# Patient Record
Sex: MALE | Race: WHITE | NOT HISPANIC OR LATINO | Employment: UNEMPLOYED | URBAN - METROPOLITAN AREA
[De-identification: names, ages, dates, MRNs, and addresses within clinical notes are randomized per-mention and may not be internally consistent; named-entity substitution may affect disease eponyms.]

---

## 2021-02-04 ENCOUNTER — HOSPITAL ENCOUNTER (EMERGENCY)
Facility: HOSPITAL | Age: 56
Discharge: HOME | End: 2021-02-04
Attending: EMERGENCY MEDICINE | Admitting: EMERGENCY MEDICINE
Payer: COMMERCIAL

## 2021-02-04 VITALS
TEMPERATURE: 98.1 F | DIASTOLIC BLOOD PRESSURE: 60 MMHG | SYSTOLIC BLOOD PRESSURE: 107 MMHG | WEIGHT: 250 LBS | OXYGEN SATURATION: 97 % | HEIGHT: 75 IN | BODY MASS INDEX: 31.08 KG/M2 | RESPIRATION RATE: 18 BRPM | HEART RATE: 79 BPM

## 2021-02-04 DIAGNOSIS — F32.A DEPRESSION: Primary | ICD-10-CM

## 2021-02-04 PROCEDURE — 99282 EMERGENCY DEPT VISIT SF MDM: CPT | Performed by: EMERGENCY MEDICINE

## 2021-02-04 PROCEDURE — 99284 EMERGENCY DEPT VISIT MOD MDM: CPT

## 2021-02-05 NOTE — ED PROVIDER NOTES
History  Chief Complaint   Patient presents with    Psychiatric Evaluation     pt states that his PCP sent him here for depression states he is having a hard time going out and doing anything, denies HI , and states sometimes he has SI but no plan      HPI patient is a 40-year-old male, history of depression in the past, here with his mother who reports that he was on medication in the past which seems to help him she does not know what it is  Apparently she has tried to get him into a psychiatric helpful but apparently has difficulty making any appointment due to [place]  Apparently was seen by his family provider referred to the emergency department  Patient has no plan  He does not have suicidal thoughts he reports he just feels like he should not be here anymore  He has not made any action on a plan to harm himself  He has no real desire to kill himself currently but reports he feels very sad  Past medical history of appendectomy depression, patient reports cardiomyopathy but does not know his medications  Family history noncontributory  Social history, nonsmoker no history of drug abuse, here with his mother    None       History reviewed  No pertinent past medical history  Past Surgical History:   Procedure Laterality Date    APPENDECTOMY         History reviewed  No pertinent family history  I have reviewed and agree with the history as documented  E-Cigarette/Vaping    E-Cigarette Use Never User      E-Cigarette/Vaping Substances     Social History     Tobacco Use    Smoking status: Never Smoker    Smokeless tobacco: Never Used   Substance Use Topics    Alcohol use: Never     Frequency: Never    Drug use: Not on file       Review of Systems   Constitutional: Negative for diaphoresis, fatigue and fever  HENT: Negative for congestion, ear pain, nosebleeds and sore throat  Eyes: Negative for photophobia, pain, discharge and visual disturbance     Respiratory: Negative for cough, choking, chest tightness, shortness of breath and wheezing  Cardiovascular: Negative for chest pain and palpitations  Gastrointestinal: Negative for abdominal distention, abdominal pain, diarrhea and vomiting  Genitourinary: Negative for dysuria, flank pain and frequency  Musculoskeletal: Negative for back pain, gait problem and joint swelling  Skin: Negative for color change and rash  Neurological: Negative for dizziness, syncope and headaches  Psychiatric/Behavioral: Negative for behavioral problems, confusion, self-injury and suicidal ideas  The patient is not nervous/anxious  All other systems reviewed and are negative  Physical Exam  Physical Exam  Vitals signs and nursing note reviewed  Constitutional:       Appearance: He is well-developed  HENT:      Head: Normocephalic  Right Ear: External ear normal       Left Ear: External ear normal       Nose: Nose normal    Eyes:      General: Lids are normal       Pupils: Pupils are equal, round, and reactive to light  Neck:      Musculoskeletal: Normal range of motion and neck supple  Cardiovascular:      Rate and Rhythm: Normal rate and regular rhythm  Pulmonary:      Effort: Pulmonary effort is normal  No respiratory distress  Breath sounds: Normal breath sounds  Musculoskeletal: Normal range of motion  General: No deformity  Skin:     General: Skin is warm and dry  Neurological:      Mental Status: He is alert and oriented to person, place, and time  Psychiatric:      Comments: Patient denies suicidal ideation or plan to me, denies any homicidal thoughts  He reports feeling sad and depressed at times           Vital Signs  ED Triage Vitals [02/04/21 1831]   Temperature Pulse Respirations Blood Pressure SpO2   98 1 °F (36 7 °C) 79 18 107/60 97 %      Temp Source Heart Rate Source Patient Position - Orthostatic VS BP Location FiO2 (%)   Temporal Monitor Sitting Left arm --      Pain Score       --           Vitals: 02/04/21 1831   BP: 107/60   Pulse: 79   Patient Position - Orthostatic VS: Sitting         Visual Acuity      ED Medications  Medications - No data to display    Diagnostic Studies  Results Reviewed     None                 No orders to display              Procedures  Procedures         ED Course                    seen with the crisis worker, there is no suicide plan, no indication for admission at this time, no indication to remove the patient's rights  SBIRT 20yo+      Most Recent Value   SBIRT (24 yo +)   In order to provide better care to our patients, we are screening all of our patients for alcohol and drug use  Would it be okay to ask you these screening questions? Yes Filed at: 02/04/2021 2053   Initial Alcohol Screen: US AUDIT-C    1  How often do you have a drink containing alcohol?  0 Filed at: 02/04/2021 2053   2  How many drinks containing alcohol do you have on a typical day you are drinking? 0 Filed at: 02/04/2021 2053   3a  Male UNDER 65: How often do you have five or more drinks on one occasion? 0 Filed at: 02/04/2021 2053   3b  FEMALE Any Age, or MALE 65+: How often do you have 4 or more drinks on one occassion? 0 Filed at: 02/04/2021 2053   Audit-C Score  0 Filed at: 02/04/2021 2053   DEANNA: How many times in the past year have you    Used an illegal drug or used a prescription medication for non-medical reasons? Never Filed at: 02/04/2021 2053                    MDM medical decision making 49-year-old male presents emergency department with depression, referred by his family doctor, discussed with the mom, she is having trouble getting a psychiatrist to see him  We gave her a copy of the local referrals and I circled the psychiatrist to would be most likely to have time to see the patient  We discussed patient is not currently suicidal there are no grounds for commitment or admission at this time  Discussed outpatient treatment and follow-up      Disposition  Final diagnoses:   Depression     Time reflects when diagnosis was documented in both MDM as applicable and the Disposition within this note     Time User Action Codes Description Comment    2/4/2021  9:28 PM Jacqueline Morning Add [F32 9] Depression       ED Disposition     ED Disposition Condition Date/Time Comment    Discharge Stable Thu Feb 4, 2021  9:28 PM [address] [place] [address] Smiley discharge to home/self care  MD Documentation      Most Recent Value   Sending MD Sarah Olvera      Follow-up Information    None         There are no discharge medications for this patient  No discharge procedures on file      PDMP Review     None          ED Provider  Electronically Signed by           Sarah Olvera MD  02/04/21 4049

## 2021-02-05 NOTE — ED NOTES
BRYANNA met with Gregorio Snowden who was sent to the ED following a doctors appointment with his PCP  Patient reports being depressed over medical problems and the fact he hasn't worked in a year  Patient moved to [place] due to health issues and has is living with his mother for approximately 1 year  Patient states that at times he has thoughts that it would be better off if he weren't here and has thoughts of hopelessness, patient reports he does not think of killing himself he reports he could never do that   He denies hi/av hallucinations  Patient is oriented x4 and feels that he can contract for safety outside of the hospital   Patient is looking for OP resources to get back on medications  CW spoke with attending who was agreeable to this plan,  BRYANNA provided OP resources to patient      JK-CIS

## 2021-11-09 ENCOUNTER — HOSPITAL ENCOUNTER (EMERGENCY)
Facility: HOSPITAL | Age: 56
Discharge: HOME | End: 2021-11-09
Attending: EMERGENCY MEDICINE | Admitting: EMERGENCY MEDICINE
Payer: COMMERCIAL

## 2021-11-09 ENCOUNTER — APPOINTMENT (EMERGENCY)
Dept: RADIOLOGY | Facility: HOSPITAL | Age: 56
End: 2021-11-09
Payer: COMMERCIAL

## 2021-11-09 VITALS
BODY MASS INDEX: 33.57 KG/M2 | HEIGHT: 75 IN | OXYGEN SATURATION: 94 % | RESPIRATION RATE: 21 BRPM | DIASTOLIC BLOOD PRESSURE: 81 MMHG | SYSTOLIC BLOOD PRESSURE: 154 MMHG | TEMPERATURE: 97 F | HEART RATE: 77 BPM | WEIGHT: 270 LBS

## 2021-11-09 DIAGNOSIS — R53.1 GENERALIZED WEAKNESS: Primary | ICD-10-CM

## 2021-11-09 LAB
2HR DELTA HS TROPONIN: 1 NG/L
ALBUMIN SERPL BCP-MCNC: 4.1 G/DL (ref 3.5–5)
ALP SERPL-CCNC: 73 U/L (ref 46–116)
ALT SERPL W P-5'-P-CCNC: 34 U/L (ref 12–78)
ANION GAP SERPL CALCULATED.3IONS-SCNC: 13 MMOL/L (ref 4–13)
AST SERPL W P-5'-P-CCNC: 12 U/L (ref 5–45)
BASOPHILS # BLD MANUAL: 0.32 THOUSAND/UL (ref 0–0.1)
BASOPHILS NFR MAR MANUAL: 2 % (ref 0–1)
BILIRUB DIRECT SERPL-MCNC: 0.22 MG/DL (ref 0–0.2)
BILIRUB SERPL-MCNC: 1.52 MG/DL (ref 0.2–1)
BUN SERPL-MCNC: 21 MG/DL (ref 5–25)
CALCIUM SERPL-MCNC: 8.8 MG/DL (ref 8.3–10.1)
CARDIAC TROPONIN I PNL SERPL HS: 6 NG/L
CARDIAC TROPONIN I PNL SERPL HS: 7 NG/L
CHLORIDE SERPL-SCNC: 107 MMOL/L (ref 100–108)
CO2 SERPL-SCNC: 23 MMOL/L (ref 21–32)
CREAT SERPL-MCNC: 1.08 MG/DL (ref 0.6–1.3)
EOSINOPHIL # BLD MANUAL: 0 THOUSAND/UL (ref 0–0.4)
EOSINOPHIL NFR BLD MANUAL: 0 % (ref 0–6)
ERYTHROCYTE [DISTWIDTH] IN BLOOD BY AUTOMATED COUNT: 14.3 % (ref 11.6–15.1)
FLUAV RNA RESP QL NAA+PROBE: NEGATIVE
FLUBV RNA RESP QL NAA+PROBE: NEGATIVE
GFR SERPL CREATININE-BSD FRML MDRD: 76 ML/MIN/1.73SQ M
GLUCOSE SERPL-MCNC: 120 MG/DL (ref 65–140)
HCT VFR BLD AUTO: 46.6 % (ref 36.5–49.3)
HGB BLD-MCNC: 15.4 G/DL (ref 12–17)
LACTATE SERPL-SCNC: 1.3 MMOL/L (ref 0.5–2)
LIPASE SERPL-CCNC: 94 U/L (ref 73–393)
LYMPHOCYTES # BLD AUTO: 55 % (ref 14–44)
LYMPHOCYTES # BLD AUTO: 8.9 THOUSAND/UL (ref 0.6–4.47)
MCH RBC QN AUTO: 30.2 PG (ref 26.8–34.3)
MCHC RBC AUTO-ENTMCNC: 33 G/DL (ref 31.4–37.4)
MCV RBC AUTO: 91 FL (ref 82–98)
MONOCYTES # BLD AUTO: 0.49 THOUSAND/UL (ref 0–1.22)
MONOCYTES NFR BLD: 3 % (ref 4–12)
NEUTROPHILS # BLD MANUAL: 5.02 THOUSAND/UL (ref 1.85–7.62)
NEUTS SEG NFR BLD AUTO: 31 % (ref 43–75)
NT-PROBNP SERPL-MCNC: 420 PG/ML
PLATELET # BLD AUTO: 239 THOUSANDS/UL (ref 149–390)
PLATELET BLD QL SMEAR: ADEQUATE
PMV BLD AUTO: 9.7 FL (ref 8.9–12.7)
POTASSIUM SERPL-SCNC: 4.2 MMOL/L (ref 3.5–5.3)
PROT SERPL-MCNC: 7.4 G/DL (ref 6.4–8.2)
RBC # BLD AUTO: 5.1 MILLION/UL (ref 3.88–5.62)
RSV RNA RESP QL NAA+PROBE: NEGATIVE
SARS-COV-2 RNA RESP QL NAA+PROBE: NEGATIVE
SODIUM SERPL-SCNC: 143 MMOL/L (ref 136–145)
VARIANT LYMPHS # BLD AUTO: 9 %
WBC # BLD AUTO: 16.18 THOUSAND/UL (ref 4.31–10.16)

## 2021-11-09 PROCEDURE — 85007 BL SMEAR W/DIFF WBC COUNT: CPT | Performed by: EMERGENCY MEDICINE

## 2021-11-09 PROCEDURE — 71046 X-RAY EXAM CHEST 2 VIEWS: CPT

## 2021-11-09 PROCEDURE — 84484 ASSAY OF TROPONIN QUANT: CPT | Performed by: EMERGENCY MEDICINE

## 2021-11-09 PROCEDURE — 80076 HEPATIC FUNCTION PANEL: CPT | Performed by: EMERGENCY MEDICINE

## 2021-11-09 PROCEDURE — 99285 EMERGENCY DEPT VISIT HI MDM: CPT

## 2021-11-09 PROCEDURE — 80048 BASIC METABOLIC PNL TOTAL CA: CPT | Performed by: EMERGENCY MEDICINE

## 2021-11-09 PROCEDURE — 85027 COMPLETE CBC AUTOMATED: CPT | Performed by: EMERGENCY MEDICINE

## 2021-11-09 PROCEDURE — 83690 ASSAY OF LIPASE: CPT | Performed by: EMERGENCY MEDICINE

## 2021-11-09 PROCEDURE — 93005 ELECTROCARDIOGRAM TRACING: CPT

## 2021-11-09 PROCEDURE — 0241U HB NFCT DS VIR RESP RNA 4 TRGT: CPT | Performed by: EMERGENCY MEDICINE

## 2021-11-09 PROCEDURE — 83880 ASSAY OF NATRIURETIC PEPTIDE: CPT | Performed by: EMERGENCY MEDICINE

## 2021-11-09 PROCEDURE — 99284 EMERGENCY DEPT VISIT MOD MDM: CPT | Performed by: EMERGENCY MEDICINE

## 2021-11-09 PROCEDURE — 36415 COLL VENOUS BLD VENIPUNCTURE: CPT | Performed by: EMERGENCY MEDICINE

## 2021-11-09 PROCEDURE — 83605 ASSAY OF LACTIC ACID: CPT | Performed by: EMERGENCY MEDICINE

## 2021-11-10 LAB
ATRIAL RATE: 98 BPM
P AXIS: 48 DEGREES
PR INTERVAL: 176 MS
QRS AXIS: 4 DEGREES
QRSD INTERVAL: 122 MS
QT INTERVAL: 380 MS
QTC INTERVAL: 485 MS
T WAVE AXIS: 50 DEGREES
VENTRICULAR RATE: 98 BPM

## 2021-11-10 PROCEDURE — 93010 ELECTROCARDIOGRAM REPORT: CPT | Performed by: INTERNAL MEDICINE

## 2023-04-28 ENCOUNTER — TELEPHONE (OUTPATIENT)
Dept: GASTROENTEROLOGY | Facility: CLINIC | Age: 58
End: 2023-04-28

## 2023-05-09 ENCOUNTER — OFFICE VISIT (OUTPATIENT)
Dept: GASTROENTEROLOGY | Facility: CLINIC | Age: 58
End: 2023-05-09

## 2023-05-09 VITALS
HEART RATE: 78 BPM | DIASTOLIC BLOOD PRESSURE: 80 MMHG | BODY MASS INDEX: 33.22 KG/M2 | WEIGHT: 267.2 LBS | HEIGHT: 75 IN | SYSTOLIC BLOOD PRESSURE: 112 MMHG | OXYGEN SATURATION: 97 % | RESPIRATION RATE: 18 BRPM

## 2023-05-09 DIAGNOSIS — Z87.19 HISTORY OF ESOPHAGITIS: ICD-10-CM

## 2023-05-09 DIAGNOSIS — R19.7 DIARRHEA, UNSPECIFIED TYPE: ICD-10-CM

## 2023-05-09 DIAGNOSIS — R11.0 NAUSEA: Primary | ICD-10-CM

## 2023-05-09 DIAGNOSIS — R63.0 POOR APPETITE: ICD-10-CM

## 2023-05-09 RX ORDER — SPIRONOLACTONE 25 MG/1
TABLET ORAL
COMMUNITY
Start: 2023-03-26

## 2023-05-09 RX ORDER — SIMVASTATIN 40 MG
40 TABLET ORAL DAILY
COMMUNITY
Start: 2023-03-17

## 2023-05-09 RX ORDER — SERTRALINE HYDROCHLORIDE 100 MG/1
TABLET, FILM COATED ORAL
COMMUNITY
Start: 2023-02-02

## 2023-05-09 RX ORDER — PANTOPRAZOLE SODIUM 40 MG/1
40 TABLET, DELAYED RELEASE ORAL DAILY
Qty: 30 TABLET | Refills: 2 | Status: SHIPPED | OUTPATIENT
Start: 2023-05-09 | End: 2023-06-08

## 2023-05-09 RX ORDER — MULTIVIT-MIN/IRON FUM/FOLIC AC 7.5 MG-4
1 TABLET ORAL
COMMUNITY

## 2023-05-09 RX ORDER — PANTOPRAZOLE SODIUM 20 MG/1
TABLET, DELAYED RELEASE ORAL
COMMUNITY
Start: 2023-05-03 | End: 2023-05-09 | Stop reason: ALTCHOICE

## 2023-05-09 RX ORDER — BUPROPION HYDROCHLORIDE 300 MG/1
300 TABLET ORAL
COMMUNITY
Start: 2022-12-01

## 2023-05-09 RX ORDER — ONDANSETRON 4 MG/1
TABLET, ORALLY DISINTEGRATING ORAL
COMMUNITY
Start: 2023-04-27

## 2023-05-09 RX ORDER — CLONAZEPAM 0.5 MG/1
0.5 TABLET ORAL DAILY PRN
COMMUNITY

## 2023-05-09 RX ORDER — CARVEDILOL 3.12 MG/1
TABLET ORAL
COMMUNITY
Start: 2023-03-09

## 2023-05-09 RX ORDER — FOLIC ACID 1 MG/1
1 TABLET ORAL DAILY
COMMUNITY
Start: 2022-12-01

## 2023-05-09 RX ORDER — SACUBITRIL AND VALSARTAN 49; 51 MG/1; MG/1
1 TABLET, FILM COATED ORAL 2 TIMES DAILY
COMMUNITY
Start: 2023-04-06

## 2023-05-09 NOTE — PATIENT INSTRUCTIONS
Scheduled date of EGD/colonoscopy (as of today): 7/31/23  Physician performing EGD/colonoscopy: Phill Kang  Location of EGD/colonoscopy: [facility]  Desired bowel prep reviewed with patient: Lucio/Benjamin  Instructions reviewed with patient by: Judene Merlin T  Clearances:

## 2023-05-09 NOTE — PROGRESS NOTES
[facility] - Outpatient Consultation  Evy Gutierrez 59 y o  male MRN: [id number]  Encounter: [id number]          ASSESSMENT AND PLAN:      1  Nausea  2  Poor appetite  3  Diarrhea  4  History of esophagitis    Patient presents with a history of recurrent nausea after eating, poor appetite, and intermittent diarrhea  He has a history of significant esophagitis on a prior EGD in 2021  He also is struggling with depression  Will plan for EGD and colonoscopy with visualization of the terminal ileum to investigate  Will check a RUQ ultrasound and CT Scan A/P with contrast to investigate  Will check celiac serology  Recommend a fiber supplement and will also begin a PPI course with Pantoprazole 40mg po daily x 8 weeks  Follow up for management of depression as well     ______________________________________________________________________    HPI:  Patient is a 59year old male with a PMH of CLL, cardiomyopathy/CHF, HTN, CAD, depression who presents to the office for a gastrointestinal evaluation  Patient reports that he has been having issues with nausea, poor appetite, and intermittent diarrhea  He is very worried about these symptoms and is concerned he may have an underlying cancer  No rectal bleeding or melena  He also has intermittent dysphagia  He nausea can occur after eating  He had an EGD in 2021 which showed significant esophagitis  No family history of esophageal, stomach, pancreatic, or colon cancer  He also reports he struggles with depression  REVIEW OF SYSTEMS:    CONSTITUTIONAL: Denies any fever, chills, rigors, and weight loss  HEENT: No earache or tinnitus  Denies hearing loss or visual disturbances  CARDIOVASCULAR: No chest pain or palpitations  RESPIRATORY: Denies any cough, hemoptysis, shortness of breath or dyspnea on exertion  GASTROINTESTINAL: As noted in the History of Present Illness  GENITOURINARY: No problems with urination   Denies any "hematuria or dysuria  NEUROLOGIC: No dizziness or vertigo, denies headaches  MUSCULOSKELETAL: Denies any muscle or joint pain  SKIN: Denies skin rashes or itching  ENDOCRINE: Denies excessive thirst  Denies intolerance to heat or cold  PSYCHOSOCIAL: +Depression  Denies any recent memory loss  Historical Information   Past Medical History:   Diagnosis Date   • Coronary artery disease    • Hypertension    • Leukemia ([facility] [address] )      Past Surgical History:   Procedure Laterality Date   • APPENDECTOMY       Social History   Social History     Substance and Sexual Activity   Alcohol Use Never     Social History     Substance and Sexual Activity   Drug Use Not on file     Social History     Tobacco Use   Smoking Status Never   Smokeless Tobacco Never     No family history on file  Meds/Allergies       Current Outpatient Medications:   •  buPROPion (WELLBUTRIN XL) 300 mg 24 hr tablet  •  carvedilol (COREG) 3 125 mg tablet  •  Cholecalciferol 25 MCG (1000 UT) tablet  •  clonazePAM (KlonoPIN) 0 5 mg tablet  •  Entresto 65-90 MG TABS  •  folic acid (FOLVITE) 1 mg tablet  •  Multiple Vitamins-Minerals (multivitamin with minerals) tablet  •  ondansetron (ZOFRAN-ODT) 4 mg disintegrating tablet  •  pantoprazole (PROTONIX) 40 mg tablet  •  sertraline (ZOLOFT) 100 mg tablet  •  simvastatin (ZOCOR) 40 mg tablet  •  spironolactone (ALDACTONE) 25 mg tablet    No Known Allergies        Objective     Blood pressure 112/80, pulse 78, resp  rate 18, height 6' 3\" (1 905 m), weight 121 kg (267 lb 3 2 oz), SpO2 97 %  Body mass index is 33 4 kg/m²  PHYSICAL EXAM:      General Appearance:   Alert, cooperative, no distress   HEENT:   Normocephalic, atraumatic, anicteric      Neck:  Supple, symmetrical, trachea midline   Lungs:   Clear to auscultation bilaterally; no rales, rhonchi or wheezing; respirations unlabored    Heart[de-identified]   Regular rate and rhythm; no murmur, rub, or gallop     Abdomen:   Soft, non-tender, non-distended; " normal bowel sounds; no masses, no organomegaly    Genitalia:   Deferred    Rectal:   Deferred    Extremities:  No cyanosis, clubbing or edema    Pulses:  2+ and symmetric    Skin:  No jaundice, rashes, or lesions    Lymph nodes:  No palpable cervical lymphadenopathy        Lab Results:   No visits with results within 1 Day(s) from this visit     Latest known visit with results is:   Admission on 11/09/2021, Discharged on 11/09/2021   Component Date Value   • Sodium 11/09/2021 143    • Potassium 11/09/2021 4 2    • Chloride 11/09/2021 107    • CO2 11/09/2021 23    • ANION GAP 11/09/2021 13    • BUN 11/09/2021 21    • Creatinine 11/09/2021 1 08    • Glucose 11/09/2021 120    • Calcium 11/09/2021 8 8    • eGFR 11/09/2021 76    • Total Bilirubin 11/09/2021 1 52 (H)    • Bilirubin, Direct 11/09/2021 0 22 (H)    • Alkaline Phosphatase 11/09/2021 73    • AST 11/09/2021 12    • ALT 11/09/2021 34    • Total Protein 11/09/2021 7 4    • Albumin 11/09/2021 4 1    • WBC 11/09/2021 16 18 (H)    • RBC 11/09/2021 5 10    • Hemoglobin 11/09/2021 15 4    • Hematocrit 11/09/2021 46 6    • MCV 11/09/2021 91    • MCH 11/09/2021 30 2    • MCHC 11/09/2021 33 0    • RDW 11/09/2021 14 3    • MPV 11/09/2021 9 7    • Platelets 74/40/9271 239    • LACTIC ACID 11/09/2021 1 3    • Lipase 11/09/2021 94    • NT-proBNP 11/09/2021 420 (H)    • hs TnI 0hr 11/09/2021 6    • SARS-CoV-2 11/09/2021 Negative    • INFLUENZA A PCR 11/09/2021 Negative    • INFLUENZA B PCR 11/09/2021 Negative    • RSV PCR 11/09/2021 Negative    • hs TnI 2hr 11/09/2021 7    • Delta 2hr hsTnI 11/09/2021 1    • Segmented % 11/09/2021 31 (L)    • Lymphocytes % 11/09/2021 55 (H)    • Monocytes % 11/09/2021 3 (L)    • Eosinophils, % 11/09/2021 0    • Basophils % 11/09/2021 2 (H)    • Atypical Lymphocytes % 11/09/2021 9 (H)    • Absolute Neutrophils 11/09/2021 5 02    • Lymphocytes Absolute 11/09/2021 8 90 (H)    • Monocytes Absolute 11/09/2021 0 49    • Eosinophils Absolute 11/09/2021 0 00    • Basophils Absolute 11/09/2021 0 32 (H)    • Platelet Estimate 71/27/8371 Adequate    • Ventricular Rate 11/09/2021 98    • Atrial Rate 11/09/2021 98    • FL Interval 11/09/2021 176    • QRSD Interval 11/09/2021 122    • QT Interval 11/09/2021 380    • QTC Interval 11/09/2021 485    • P Axis 11/09/2021 48    • QRS Axis 11/09/2021 4    • T Wave Axis 11/09/2021 50          Radiology Results:   No results found

## 2023-05-18 ENCOUNTER — HOSPITAL ENCOUNTER (OUTPATIENT)
Dept: ULTRASOUND IMAGING | Facility: CLINIC | Age: 58
Discharge: HOME | End: 2023-05-18

## 2023-05-18 DIAGNOSIS — R63.0 POOR APPETITE: ICD-10-CM

## 2023-05-18 DIAGNOSIS — R11.0 NAUSEA: ICD-10-CM

## 2023-05-23 ENCOUNTER — TELEPHONE (OUTPATIENT)
Dept: OTHER | Facility: OTHER | Age: 58
End: 2023-05-23

## 2023-05-23 DIAGNOSIS — K80.20 GALLSTONES: Primary | ICD-10-CM

## 2023-05-23 NOTE — TELEPHONE ENCOUNTER
Patient requesting a call back to discuss test results         Ordering Provider: Kathya Olivia PA-C  Date Completed: 5/18/23  Lab []  MRI []  X-Ray []  CT []  Colonoscopy []  EGD []  Biopsy []  Other [x]US Abdomen

## 2023-05-23 NOTE — TELEPHONE ENCOUNTER
Reviewed results with Lilliana Headley  US shows gallstones, no evidence of acute cholecystitis  Unclear if his symptoms/postprandial nausea is caused by the gallstones at this time or not  He did not try the PPI course yet (he has a h/o significant esophagitis in the past)  She will pick it up for him to start  He needs to proceed with the CT Scan A/P with contrast and the endoscopic evaluation to further evaluate for other potential etiologies of his symptoms    I will plan a general surgery consult regarding the gallstones but we did discuss that he definitely needs to still undergo the further gastrointestinal testing first

## 2023-05-25 ENCOUNTER — HOSPITAL ENCOUNTER (OUTPATIENT)
Dept: RADIOLOGY | Facility: MEDICAL CENTER | Age: 58
Discharge: HOME | End: 2023-05-25

## 2023-05-25 DIAGNOSIS — R63.0 POOR APPETITE: ICD-10-CM

## 2023-05-25 DIAGNOSIS — R11.0 NAUSEA: ICD-10-CM

## 2023-05-25 RX ADMIN — IOHEXOL 100 ML: 350 INJECTION, SOLUTION INTRAVENOUS at 11:20

## 2023-05-31 ENCOUNTER — TELEPHONE (OUTPATIENT)
Dept: GASTROENTEROLOGY | Facility: CLINIC | Age: 58
End: 2023-05-31

## 2023-05-31 NOTE — TELEPHONE ENCOUNTER
Patient sister is requesting a call back to discuss test results         Ordering Provider:   Date Completed:     Lab []  MRI []  X-Ray []  CT [x]  Colonoscopy []  EGD []  Biopsy []  Other []

## 2023-06-01 ENCOUNTER — TELEPHONE (OUTPATIENT)
Dept: GASTROENTEROLOGY | Facility: CLINIC | Age: 58
End: 2023-06-01

## 2023-06-01 NOTE — TELEPHONE ENCOUNTER
----- Message from [id number] Jake Nuñez PA-C sent at 6/1/2023  3:50 PM EDT -----  Please advise patient that he has a small left inguinal hernia and a small bellybutton hernia neither of which are the source of his symptoms  He does have gallstones  Although this can cause nausea vomiting and abdominal pain it does not typically cause diarrhea    Wilhemena Divina will follow-up after procedures scheduled for July 31

## 2023-06-01 NOTE — TELEPHONE ENCOUNTER
Called pt, left detailed voicemail on machine with CT results      Also stated he should make a follow up appointment after his colonoscopy in July

## 2023-06-07 ENCOUNTER — CONSULT (OUTPATIENT)
Dept: SURGERY | Facility: CLINIC | Age: 58
End: 2023-06-07
Payer: MEDICARE

## 2023-06-07 VITALS
OXYGEN SATURATION: 99 % | RESPIRATION RATE: 18 BRPM | TEMPERATURE: 97.5 F | BODY MASS INDEX: 33.55 KG/M2 | DIASTOLIC BLOOD PRESSURE: 72 MMHG | HEIGHT: 75 IN | HEART RATE: 93 BPM | WEIGHT: 269.8 LBS | SYSTOLIC BLOOD PRESSURE: 124 MMHG

## 2023-06-07 DIAGNOSIS — K80.20 GALLSTONES: ICD-10-CM

## 2023-06-07 PROCEDURE — 99204 OFFICE O/P NEW MOD 45 MIN: CPT | Performed by: STUDENT IN AN ORGANIZED HEALTH CARE EDUCATION/TRAINING PROGRAM

## 2023-06-07 NOTE — PROGRESS NOTES
Assessment/Plan:  49-year-old male with gallstones and nausea  -Patient presents for evaluation of gallstones  -Notes he does feel nauseous after he eats, denies any abdominal pain after he eats  -Does have intermittent abdominal pain which is vague and does not last long, not related to any p o  intake  -Has been feeling very depressed lately  -Recently underwent cardiac ablation  -Has an upcoming appointment with gastroenterology for colonoscopy and EGD  -Gastroenterology note from 5/9/2023 reviewed  -CT scan of the abdomen and pelvis from 5/25/2023 report and images reviewed, this was also reviewed with the patient  -Abdominal ultrasound from 5/18/2023 report and images reviewed  -Echocardiogram report from 6/6/2023 reviewed, this is in [facility], noted to have an ejection fraction of 30 to 35%  -CBC and CMP from 4/27/2023 reviewed, this is in [facility]  -Cardiology ablation note from 4/12/2023 reviewed, this is in Care Everywhere  -Echocardiogram report from 3/21/2023 reviewed, this is in Care Everywhere, ejection fraction of 25 to 30%  -I do believe the patient has some symptoms in relation to gallstones but he does have many additional complaints and has a significant cardiac history  -Follow-up in office after GI work-up and Cardiology appointment to consider cholecystectomy     1  Gallstones  -     Ambulatory Referral to General Surgery               Subjective:      Patient ID: Carlos Ovalle is a 59 y o  male  Triage Notes:    Patient is a 49-year-old male who presents the office for evaluation of gallstones  Patient has multiple GI complaints at this time  He does note he has nausea after every time he eats, denies any vomiting or bloating  Denies any abdominal pain after p o  intake  He does state he gets intermittent abdominal pain which goes away almost instantly  This has been infrequent  He also states he has intermittent diarrhea and his stool varies in color from day to day  He also states his urine is darker but he does not drink a lot of water  Patient overall has been very depressed recently  The following portions of the patient's history were reviewed and updated as appropriate:   He  has a past medical history of Coronary artery disease, Hypertension, and Leukemia ([facility] [address] )  He   Patient Active Problem List    Diagnosis Date Noted   • Gallstones 06/07/2023     He  has a past surgical history that includes Appendectomy  His family history is not on file  He  reports that he has never smoked  He has never used smokeless tobacco  He reports that he does not drink alcohol and does not use drugs  Current Outpatient Medications on File Prior to Visit   Medication Sig   • buPROPion (WELLBUTRIN XL) 300 mg 24 hr tablet Take 300 mg by mouth   • carvedilol (COREG) 3 125 mg tablet    • Cholecalciferol 25 MCG (1000 UT) tablet Take 2,000 Units by mouth daily   • clonazePAM (KlonoPIN) 0 5 mg tablet Take 0 5 mg by mouth daily as needed   • Entresto 49-51 MG TABS Take 1 tablet by mouth 2 (two) times a day   • folic acid (FOLVITE) 1 mg tablet Take 1 mg by mouth daily   • Multiple Vitamins-Minerals (multivitamin with minerals) tablet Take 1 tablet by mouth   • ondansetron (ZOFRAN-ODT) 4 mg disintegrating tablet    • sertraline (ZOLOFT) 100 mg tablet    • spironolactone (ALDACTONE) 25 mg tablet    • simvastatin (ZOCOR) 40 mg tablet Take 40 mg by mouth daily (Patient not taking: Reported on 6/7/2023)     No current facility-administered medications on file prior to visit  He has No Known Allergies       Review of Systems   Constitutional: Negative for chills and fever  HENT: Negative for congestion, hearing loss, rhinorrhea and sore throat  Eyes: Negative for pain and discharge  Respiratory: Negative for cough, chest tightness and shortness of breath  Cardiovascular: Negative for chest pain and palpitations  Gastrointestinal: Positive for diarrhea   Negative for abdominal pain, "constipation, nausea and vomiting  Endocrine: Negative for cold intolerance and heat intolerance  Genitourinary: Negative for difficulty urinating and dysuria  Musculoskeletal: Negative for back pain and neck pain  Skin: Negative for color change and rash  Allergic/Immunologic: Negative for environmental allergies and food allergies  Neurological: Negative for seizures and headaches  Hematological: Negative for adenopathy  Does not bruise/bleed easily  Psychiatric/Behavioral: Negative for confusion and hallucinations  Positive depression         Objective:      /72 (BP Location: Left arm, Patient Position: Sitting, Cuff Size: Standard)   Pulse 93   Temp 97 5 °F (36 4 °C)   Resp 18   Ht 6' 3\" (1 905 m)   Wt 122 kg (269 lb 12 8 oz)   SpO2 99%   BMI 33 72 kg/m²     Below is the patient's most recent value for Albumin, ALT, AST, BUN, Calcium, Chloride, Cholesterol, CO2, Creatinine, GFR, Glucose, HDL, Hematocrit, Hemoglobin, Hemoglobin A1C, LDL, Magnesium, Phosphorus, Platelets, Potassium, PSA, Sodium, Triglycerides, and WBC  Lab Results   Component Value Date    ALT 34 11/09/2021    AST 12 11/09/2021    BUN 21 11/09/2021    CALCIUM 8 8 11/09/2021     11/09/2021    CO2 23 11/09/2021    CREATININE 1 08 11/09/2021    HCT 46 6 11/09/2021    HGB 15 4 11/09/2021    HGBA1C 5 1 05/03/2020    K 4 2 11/09/2021     11/09/2021    WBC 16 18 (H) 11/09/2021     Note: for a comprehensive list of the patient's lab results, access the Results Review activity  Physical Exam  Constitutional:       Appearance: Normal appearance  HENT:      Head: Normocephalic and atraumatic  Nose: Nose normal    Eyes:      General: No scleral icterus  Conjunctiva/sclera: Conjunctivae normal    Cardiovascular:      Rate and Rhythm: Normal rate and regular rhythm  Heart sounds: Normal heart sounds     Pulmonary:      Effort: Pulmonary effort is normal       Breath sounds: Normal breath " sounds  Abdominal:      General: There is no distension  Palpations: Abdomen is soft  Tenderness: There is no abdominal tenderness  Musculoskeletal:         General: No signs of injury  Skin:     General: Skin is warm  Coloration: Skin is not jaundiced  Neurological:      General: No focal deficit present  Mental Status: He is alert and oriented to person, place, and time     Psychiatric:      Comments: Depressed

## 2023-07-31 ENCOUNTER — ANESTHESIA (OUTPATIENT)
Dept: GASTROENTEROLOGY | Facility: HOSPITAL | Age: 58
End: 2023-07-31

## 2023-07-31 ENCOUNTER — HOSPITAL ENCOUNTER (OUTPATIENT)
Dept: GASTROENTEROLOGY | Facility: HOSPITAL | Age: 58
Setting detail: OUTPATIENT SURGERY
Discharge: HOME | End: 2023-07-31
Admitting: INTERNAL MEDICINE
Payer: MEDICARE

## 2023-07-31 ENCOUNTER — ANESTHESIA EVENT (OUTPATIENT)
Dept: GASTROENTEROLOGY | Facility: HOSPITAL | Age: 58
End: 2023-07-31

## 2023-07-31 VITALS
DIASTOLIC BLOOD PRESSURE: 65 MMHG | RESPIRATION RATE: 18 BRPM | OXYGEN SATURATION: 94 % | HEART RATE: 85 BPM | TEMPERATURE: 97.2 F | SYSTOLIC BLOOD PRESSURE: 114 MMHG

## 2023-07-31 DIAGNOSIS — R63.0 POOR APPETITE: ICD-10-CM

## 2023-07-31 DIAGNOSIS — K29.60 EROSIVE GASTRITIS: ICD-10-CM

## 2023-07-31 DIAGNOSIS — Z87.19 HISTORY OF ESOPHAGITIS: ICD-10-CM

## 2023-07-31 DIAGNOSIS — R11.0 NAUSEA: ICD-10-CM

## 2023-07-31 DIAGNOSIS — R19.7 DIARRHEA, UNSPECIFIED TYPE: ICD-10-CM

## 2023-07-31 DIAGNOSIS — K22.10 EROSIVE ESOPHAGITIS: Primary | ICD-10-CM

## 2023-07-31 PROBLEM — I50.22 CHRONIC SYSTOLIC HEART FAILURE (HCC): Status: ACTIVE | Noted: 2023-07-31

## 2023-07-31 PROCEDURE — 45380 COLONOSCOPY AND BIOPSY: CPT | Performed by: INTERNAL MEDICINE

## 2023-07-31 PROCEDURE — 88342 IMHCHEM/IMCYTCHM 1ST ANTB: CPT | Performed by: PATHOLOGY

## 2023-07-31 PROCEDURE — 45385 COLONOSCOPY W/LESION REMOVAL: CPT | Performed by: INTERNAL MEDICINE

## 2023-07-31 PROCEDURE — 88305 TISSUE EXAM BY PATHOLOGIST: CPT | Performed by: PATHOLOGY

## 2023-07-31 PROCEDURE — 43239 EGD BIOPSY SINGLE/MULTIPLE: CPT | Performed by: INTERNAL MEDICINE

## 2023-07-31 RX ORDER — KETAMINE HCL IN NACL, ISO-OSM 100MG/10ML
SYRINGE (ML) INJECTION AS NEEDED
Status: DISCONTINUED | OUTPATIENT
Start: 2023-07-31 | End: 2023-07-31

## 2023-07-31 RX ORDER — LIDOCAINE HYDROCHLORIDE 20 MG/ML
INJECTION, SOLUTION EPIDURAL; INFILTRATION; INTRACAUDAL; PERINEURAL AS NEEDED
Status: DISCONTINUED | OUTPATIENT
Start: 2023-07-31 | End: 2023-07-31

## 2023-07-31 RX ORDER — PROPOFOL 10 MG/ML
INJECTION, EMULSION INTRAVENOUS AS NEEDED
Status: DISCONTINUED | OUTPATIENT
Start: 2023-07-31 | End: 2023-07-31

## 2023-07-31 RX ORDER — SODIUM CHLORIDE, SODIUM LACTATE, POTASSIUM CHLORIDE, CALCIUM CHLORIDE 600; 310; 30; 20 MG/100ML; MG/100ML; MG/100ML; MG/100ML
INJECTION, SOLUTION INTRAVENOUS CONTINUOUS PRN
Status: DISCONTINUED | OUTPATIENT
Start: 2023-07-31 | End: 2023-07-31

## 2023-07-31 RX ORDER — MIDAZOLAM HYDROCHLORIDE 2 MG/2ML
INJECTION, SOLUTION INTRAMUSCULAR; INTRAVENOUS AS NEEDED
Status: DISCONTINUED | OUTPATIENT
Start: 2023-07-31 | End: 2023-07-31

## 2023-07-31 RX ORDER — GLYCOPYRROLATE 0.2 MG/ML
INJECTION INTRAMUSCULAR; INTRAVENOUS AS NEEDED
Status: DISCONTINUED | OUTPATIENT
Start: 2023-07-31 | End: 2023-07-31

## 2023-07-31 RX ORDER — PHENYLEPHRINE HCL IN 0.9% NACL 1 MG/10 ML
SYRINGE (ML) INTRAVENOUS AS NEEDED
Status: DISCONTINUED | OUTPATIENT
Start: 2023-07-31 | End: 2023-07-31

## 2023-07-31 RX ORDER — PANTOPRAZOLE SODIUM 40 MG/1
40 TABLET, DELAYED RELEASE ORAL
Qty: 60 TABLET | Refills: 5 | Status: SHIPPED | OUTPATIENT
Start: 2023-07-31

## 2023-07-31 RX ORDER — EPHEDRINE SULFATE 50 MG/ML
INJECTION INTRAVENOUS AS NEEDED
Status: DISCONTINUED | OUTPATIENT
Start: 2023-07-31 | End: 2023-07-31

## 2023-07-31 RX ADMIN — PROPOFOL 60 MG: 10 INJECTION, EMULSION INTRAVENOUS at 12:14

## 2023-07-31 RX ADMIN — Medication 100 MCG: at 12:28

## 2023-07-31 RX ADMIN — SODIUM CHLORIDE, SODIUM LACTATE, POTASSIUM CHLORIDE, AND CALCIUM CHLORIDE: .6; .31; .03; .02 INJECTION, SOLUTION INTRAVENOUS at 10:51

## 2023-07-31 RX ADMIN — PROPOFOL 20 MG: 10 INJECTION, EMULSION INTRAVENOUS at 12:29

## 2023-07-31 RX ADMIN — MIDAZOLAM 1 MG: 1 INJECTION INTRAMUSCULAR; INTRAVENOUS at 12:14

## 2023-07-31 RX ADMIN — Medication 100 MCG: at 12:25

## 2023-07-31 RX ADMIN — PROPOFOL 20 MG: 10 INJECTION, EMULSION INTRAVENOUS at 12:38

## 2023-07-31 RX ADMIN — Medication 200 MCG: at 12:33

## 2023-07-31 RX ADMIN — MIDAZOLAM 1 MG: 1 INJECTION INTRAMUSCULAR; INTRAVENOUS at 12:13

## 2023-07-31 RX ADMIN — PROPOFOL 40 MG: 10 INJECTION, EMULSION INTRAVENOUS at 12:23

## 2023-07-31 RX ADMIN — PROPOFOL 20 MG: 10 INJECTION, EMULSION INTRAVENOUS at 12:41

## 2023-07-31 RX ADMIN — PROPOFOL 20 MG: 10 INJECTION, EMULSION INTRAVENOUS at 12:25

## 2023-07-31 RX ADMIN — PROPOFOL 30 MG: 10 INJECTION, EMULSION INTRAVENOUS at 12:33

## 2023-07-31 RX ADMIN — PROPOFOL 60 MG: 10 INJECTION, EMULSION INTRAVENOUS at 12:15

## 2023-07-31 RX ADMIN — Medication 30 MG: at 12:14

## 2023-07-31 RX ADMIN — GLYCOPYRROLATE 0.1 MCG: 0.2 INJECTION, SOLUTION INTRAMUSCULAR; INTRAVENOUS at 12:13

## 2023-07-31 RX ADMIN — EPHEDRINE SULFATE 10 MG: 50 INJECTION, SOLUTION INTRAVENOUS at 12:42

## 2023-07-31 RX ADMIN — LIDOCAINE HYDROCHLORIDE 100 MG: 20 INJECTION, SOLUTION EPIDURAL; INFILTRATION; INTRACAUDAL; PERINEURAL at 12:14

## 2023-07-31 RX ADMIN — Medication 100 MCG: at 12:29

## 2023-07-31 NOTE — INTERVAL H&P NOTE
H&P reviewed. After examining the patient I find no changes in the patients condition since the H&P had been written.     Vitals:    07/31/23 1144   BP: 127/70   Pulse: 85   Resp: 18   Temp: 97.6 °F (36.4 °C)   SpO2: 97%

## 2023-07-31 NOTE — H&P
History and Physical -  [facility]  Seth Lockhart 59 y.o. male MRN: [id number]                  HPI: Seth Lockhart is a 59y.o. year old male who presents for EGD and colonoscopy for nausea, decreased appetite, history of esophagitis, new onset diarrhea of undetermined etiology. Last colonoscopy 3 years ago elsewhere. History of polyps      REVIEW OF SYSTEMS: Per the HPI, and otherwise unremarkable. Historical Information   Past Medical History:   Diagnosis Date   • Coronary artery disease    • Hypertension    • Leukemia ([address])      Past Surgical History:   Procedure Laterality Date   • APPENDECTOMY       Social History   Social History     Substance and Sexual Activity   Alcohol Use Never     Social History     Substance and Sexual Activity   Drug Use Never     Social History     Tobacco Use   Smoking Status Never   Smokeless Tobacco Never     No family history on file. Meds/Allergies     (Not in a hospital admission)      No Known Allergies    Objective     There were no vitals taken for this visit.       PHYSICAL EXAM    Gen: NAD  CV: RRR  CHEST: Clear  ABD: soft, NT/ND  EXT: no edema  Neuro: AAO      ASSESSMENT/PLAN:  This is a 59y.o. year old male here for nausea, poor appetite, history of esophagitis, new onset diarrhea, history of polyps    PLAN:   Procedure: EGD and colonoscopy

## 2023-07-31 NOTE — ANESTHESIA POSTPROCEDURE EVALUATION
Post-Op Assessment Note    CV Status:  Stable    Pain management: adequate     Mental Status:  Alert and awake   Hydration Status:  Euvolemic and stable   PONV Controlled:  Controlled   Airway Patency:  Patent   Two or more mitigation strategies used for obstructive sleep apnea   Post Op Vitals Reviewed: Yes      Staff: Anesthesiologist, CRNA         No notable events documented.     BP 93/53 (07/31/23 1250)    Temp (!) 97.2 °F (36.2 °C) (07/31/23 1250)    Pulse 98 (07/31/23 1250)   Resp 14 (07/31/23 1250)    SpO2 95 % (07/31/23 1250)

## 2023-07-31 NOTE — ANESTHESIA PREPROCEDURE EVALUATION
Procedure:  EGD  COLONOSCOPY    Relevant Problems   CARDIO   (+) Coronary artery disease   (+) Hyperlipidemia   (+) Hypertension      GI/HEPATIC   (+) GERD (gastroesophageal reflux disease)      Cardiovascular and Mediastinum   (+) Chronic systolic heart failure (HCC)   (+) Dilated cardiomyopathy (HCC)      Digestive   (+) Gallstones      Other   (+) CLL (chronic lymphocytic leukemia) (HCC)   (+) Class 1 obesity with body mass index (BMI) of 33.0 to 33.9 in adult      TTE 6/6/23:  •  Left Ventricle: There is mild- moderate hypertrophy.  Disimpaction of   LV myocardium is seen Systolic function is severely decreased with an   ejection fraction of 30-35%. •  Left Atrium: Left atrium cavity is mildly dilated. •  Aortic Valve: There is mild regurgitation with eccentrically directed   jet. •  Mitral Valve: There is mild regurgitation. •  Tricuspid Valve: There is mild regurgitation. S/p PVC ablation 4/12/23  Physical Exam    Airway    Mallampati score: II  TM Distance: >3 FB  Neck ROM: full     Dental       Cardiovascular      Pulmonary      Other Findings        Anesthesia Plan  ASA Score- 3     Anesthesia Type- IV sedation with anesthesia with ASA Monitors.          Additional Monitors:   Airway Plan:     Comment: Recent labs personally reviewed:  Lab Results       Component                Value               Date                       WBC                      16.18 (H)           11/09/2021                 HGB                      15.4                11/09/2021                 PLT                      239                 11/09/2021            Lab Results       Component                Value               Date                       K                        4.2                 11/09/2021                 BUN                      21                  11/09/2021                 CREATININE               1.08                11/09/2021            No results found for: "PTT"   No results found for: "INR"    Blood type Wojciech Neil MD, have personally seen and evaluated the patient prior to anesthetic care. I have reviewed the pre-anesthetic record, medical history, allergies, medications and any other medical records if appropriate to the anesthetic care. If a CRNA is involved in the case, I have reviewed the CRNA assessment, if present, and agree. Patient consented for IV Sedation, general anesthesia as back up. Discussed risks of aspiration, IV infiltration, indications for conversion to general anesthesia. All questions and concerns addressed. .       Plan Factors-Exercise tolerance (METS): >4 METS. Chart reviewed. EKG reviewed. Imaging results reviewed. Existing labs reviewed. Patient summary reviewed. Patient is not a current smoker. Patient did not smoke on day of surgery. Obstructive sleep apnea risk education given perioperatively. Induction- intravenous. Postoperative Plan-     Informed Consent- Anesthetic plan and risks discussed with patient. I personally reviewed this patient with the CRNA. Discussed and agreed on the Anesthesia Plan with the CRNA. Azael Zimmer

## 2023-08-03 PROCEDURE — 88342 IMHCHEM/IMCYTCHM 1ST ANTB: CPT | Performed by: PATHOLOGY

## 2023-08-03 PROCEDURE — 88305 TISSUE EXAM BY PATHOLOGIST: CPT | Performed by: PATHOLOGY

## 2023-08-17 ENCOUNTER — TELEPHONE (OUTPATIENT)
Age: 58
End: 2023-08-17

## 2023-08-17 NOTE — TELEPHONE ENCOUNTER
Patients GI provider:  Dr. Yani Abreu    Number to return call: [phone]    Reason for call: Pt's sister calling for Pt's  results   [phone]    Scheduled procedure/appointment date if applicable:NA

## 2024-04-12 ENCOUNTER — HOSPITAL ENCOUNTER (EMERGENCY)
Facility: HOSPITAL | Age: 59
Discharge: HOME | End: 2024-04-12
Attending: EMERGENCY MEDICINE
Payer: MEDICARE

## 2024-04-12 VITALS
OXYGEN SATURATION: 97 % | DIASTOLIC BLOOD PRESSURE: 72 MMHG | SYSTOLIC BLOOD PRESSURE: 146 MMHG | RESPIRATION RATE: 18 BRPM | HEART RATE: 98 BPM | TEMPERATURE: 97.4 F

## 2024-04-12 DIAGNOSIS — F32.A DEPRESSION: Primary | ICD-10-CM

## 2024-04-12 LAB
ALBUMIN SERPL BCP-MCNC: 4.3 G/DL (ref 3.5–5)
ALP SERPL-CCNC: 52 U/L (ref 34–104)
ALT SERPL W P-5'-P-CCNC: 12 U/L (ref 7–52)
AMPHETAMINES SERPL QL SCN: NEGATIVE
ANION GAP SERPL CALCULATED.3IONS-SCNC: 9 MMOL/L (ref 4–13)
AST SERPL W P-5'-P-CCNC: 11 U/L (ref 13–39)
BARBITURATES UR QL: NEGATIVE
BASOPHILS # BLD MANUAL: 0 THOUSAND/UL (ref 0–0.1)
BASOPHILS NFR MAR MANUAL: 0 % (ref 0–1)
BENZODIAZ UR QL: NEGATIVE
BILIRUB SERPL-MCNC: 1.51 MG/DL (ref 0.2–1)
BILIRUB UR QL STRIP: NEGATIVE
BUN SERPL-MCNC: 9 MG/DL (ref 5–25)
CALCIUM SERPL-MCNC: 9.1 MG/DL (ref 8.4–10.2)
CHLORIDE SERPL-SCNC: 103 MMOL/L (ref 96–108)
CLARITY UR: CLEAR
CO2 SERPL-SCNC: 22 MMOL/L (ref 21–32)
COCAINE UR QL: NEGATIVE
COLOR UR: COLORLESS
CREAT SERPL-MCNC: 0.95 MG/DL (ref 0.6–1.3)
EOSINOPHIL # BLD MANUAL: 0.13 THOUSAND/UL (ref 0–0.4)
EOSINOPHIL NFR BLD MANUAL: 1 % (ref 0–6)
ERYTHROCYTE [DISTWIDTH] IN BLOOD BY AUTOMATED COUNT: 13.3 % (ref 11.6–15.1)
ETHANOL EXG-MCNC: 0 MG/DL
FENTANYL UR QL SCN: NEGATIVE
GFR SERPL CREATININE-BSD FRML MDRD: 86 ML/MIN/1.73SQ M
GLUCOSE SERPL-MCNC: 145 MG/DL (ref 65–140)
GLUCOSE UR STRIP-MCNC: NEGATIVE MG/DL
HCT VFR BLD AUTO: 42.3 % (ref 36.5–49.3)
HGB BLD-MCNC: 14.6 G/DL (ref 12–17)
HGB UR QL STRIP.AUTO: NEGATIVE
HYDROCODONE UR QL SCN: NEGATIVE
KETONES UR STRIP-MCNC: NEGATIVE MG/DL
LEUKOCYTE ESTERASE UR QL STRIP: NEGATIVE
LYMPHOCYTES # BLD AUTO: 50 % (ref 14–44)
LYMPHOCYTES # BLD AUTO: 8.04 THOUSAND/UL (ref 0.6–4.47)
MCH RBC QN AUTO: 30.9 PG (ref 26.8–34.3)
MCHC RBC AUTO-ENTMCNC: 34.5 G/DL (ref 31.4–37.4)
MCV RBC AUTO: 90 FL (ref 82–98)
METHADONE UR QL: NEGATIVE
MONOCYTES # BLD AUTO: 0.51 THOUSAND/UL (ref 0–1.22)
MONOCYTES NFR BLD: 4 % (ref 4–12)
NEUTROPHILS # BLD MANUAL: 4.08 THOUSAND/UL (ref 1.85–7.62)
NEUTS SEG NFR BLD AUTO: 32 % (ref 43–75)
NITRITE UR QL STRIP: NEGATIVE
OPIATES UR QL SCN: NEGATIVE
OXYCODONE+OXYMORPHONE UR QL SCN: NEGATIVE
PCP UR QL: NEGATIVE
PH UR STRIP.AUTO: 6 [PH]
PLATELET # BLD AUTO: 212 THOUSANDS/UL (ref 149–390)
PLATELET BLD QL SMEAR: ADEQUATE
PMV BLD AUTO: 10.1 FL (ref 8.9–12.7)
POTASSIUM SERPL-SCNC: 3.5 MMOL/L (ref 3.5–5.3)
PROT SERPL-MCNC: 6.4 G/DL (ref 6.4–8.4)
PROT UR STRIP-MCNC: NEGATIVE MG/DL
RBC # BLD AUTO: 4.72 MILLION/UL (ref 3.88–5.62)
SODIUM SERPL-SCNC: 134 MMOL/L (ref 135–147)
SP GR UR STRIP.AUTO: 1 (ref 1–1.03)
THC UR QL: NEGATIVE
TSH SERPL DL<=0.05 MIU/L-ACNC: 1.08 UIU/ML (ref 0.45–4.5)
UROBILINOGEN UR STRIP-ACNC: <2 MG/DL
VARIANT LYMPHS # BLD AUTO: 13 %
WBC # BLD AUTO: 12.76 THOUSAND/UL (ref 4.31–10.16)

## 2024-04-12 PROCEDURE — 99285 EMERGENCY DEPT VISIT HI MDM: CPT | Performed by: EMERGENCY MEDICINE

## 2024-04-12 PROCEDURE — 80307 DRUG TEST PRSMV CHEM ANLYZR: CPT | Performed by: EMERGENCY MEDICINE

## 2024-04-12 PROCEDURE — 85027 COMPLETE CBC AUTOMATED: CPT | Performed by: EMERGENCY MEDICINE

## 2024-04-12 PROCEDURE — 36415 COLL VENOUS BLD VENIPUNCTURE: CPT | Performed by: EMERGENCY MEDICINE

## 2024-04-12 PROCEDURE — 81003 URINALYSIS AUTO W/O SCOPE: CPT | Performed by: EMERGENCY MEDICINE

## 2024-04-12 PROCEDURE — 84443 ASSAY THYROID STIM HORMONE: CPT | Performed by: EMERGENCY MEDICINE

## 2024-04-12 PROCEDURE — 85007 BL SMEAR W/DIFF WBC COUNT: CPT | Performed by: EMERGENCY MEDICINE

## 2024-04-12 PROCEDURE — 99284 EMERGENCY DEPT VISIT MOD MDM: CPT

## 2024-04-12 PROCEDURE — 82075 ASSAY OF BREATH ETHANOL: CPT | Performed by: EMERGENCY MEDICINE

## 2024-04-12 PROCEDURE — 80053 COMPREHEN METABOLIC PANEL: CPT | Performed by: EMERGENCY MEDICINE

## 2024-04-12 NOTE — ED NOTES
BRYANNA spoke with Yael to log psych consult.    The attending psychiatrist is Dr Ambrose olguin.    ROMY London, CIS ll  04/12/24 19:03

## 2024-04-12 NOTE — ED NOTES
Virtual Monitor was placed in pt room. Pt was provided a sandwich and water.     Roberta Stacy RN  04/12/24 2713

## 2024-04-12 NOTE — ED PROVIDER NOTES
History  Chief Complaint   Patient presents with    Psychiatric Evaluation     Pt states he was sent here by PCP for depression, pt states he has had on and off SI but denies SI now      Jaswinder Lantigua is a 58 y.o.  year old male  Past Medical History:  No date: Colon polyp  No date: Coronary artery disease  No date: Hypertension  No date: Leukemia (HCC)  Social History    Tobacco Use      Smoking status: Never      Smokeless tobacco: Never    Vaping Use      Vaping status: Never Used    Alcohol use: Never    Drug use: Never    Patient presents with:  Psychiatric Evaluation: Pt states he was sent here by PCP for depression, pt states he has had on and off SI but denies SI now   This is a 58-year-old male with chronic history of depression.  Taking medications.  Has had psychiatric admission in the past.  He has a psychiatrist who he saw about a month ago.  Today had a regular follow-up appointment with his primary care physician.  He then admitted to having some suicidal thoughts without a plan.  He lives at home with his mother who helps take care of him.  Patient does not have any suicidal thoughts at this time.  He states that he is not more depressed than usual.    History obtained directly from the PATIENT              History provided by:  Patient   used: No    Psychiatric Evaluation  Presenting symptoms: depression and suicidal thoughts    Presenting symptoms: no hallucinations, no homicidal ideas, no suicidal threats and no suicide attempt    Associated symptoms: no abdominal pain, no anxiety and no chest pain        Prior to Admission Medications   Prescriptions Last Dose Informant Patient Reported? Taking?   Cholecalciferol 25 MCG (1000 UT) tablet  Self Yes No   Sig: Take 2,000 Units by mouth daily   Entresto 49-51 MG TABS  Self Yes No   Sig: Take 1 tablet by mouth 2 (two) times a day   Multiple Vitamins-Minerals (multivitamin with minerals) tablet  Self Yes No   Sig: Take 1 tablet by  mouth   buPROPion (WELLBUTRIN XL) 300 mg 24 hr tablet  Self Yes No   Sig: Take 300 mg by mouth   carvedilol (COREG) 3.125 mg tablet  Self Yes No   clonazePAM (KlonoPIN) 0.5 mg tablet  Self Yes No   Sig: Take 0.5 mg by mouth daily as needed   folic acid (FOLVITE) 1 mg tablet  Self Yes No   Sig: Take 1 mg by mouth daily   ondansetron (ZOFRAN-ODT) 4 mg disintegrating tablet  Self Yes No   pantoprazole (PROTONIX) 40 mg tablet   No No   Sig: Take 1 tablet (40 mg total) by mouth daily before breakfast   sertraline (ZOLOFT) 100 mg tablet  Self Yes No   simvastatin (ZOCOR) 40 mg tablet  Self Yes No   Sig: Take 40 mg by mouth daily   spironolactone (ALDACTONE) 25 mg tablet  Self Yes No      Facility-Administered Medications: None       Past Medical History:   Diagnosis Date    Colon polyp     Coronary artery disease     Hypertension     Leukemia (HCC)        Past Surgical History:   Procedure Laterality Date    APPENDECTOMY      COLONOSCOPY         History reviewed. No pertinent family history.  I have reviewed and agree with the history as documented.    E-Cigarette/Vaping    E-Cigarette Use Never User      E-Cigarette/Vaping Substances    Nicotine No     THC No     CBD No     Flavoring No     Other No     Unknown No      Social History     Tobacco Use    Smoking status: Never    Smokeless tobacco: Never   Vaping Use    Vaping status: Never Used   Substance Use Topics    Alcohol use: Never    Drug use: Never       Review of Systems   Constitutional:  Negative for chills and fever.   HENT:  Negative for ear pain and sore throat.    Eyes:  Negative for pain and visual disturbance.   Respiratory:  Negative for cough and shortness of breath.    Cardiovascular:  Negative for chest pain and palpitations.   Gastrointestinal:  Negative for abdominal pain and vomiting.   Genitourinary:  Negative for dysuria and hematuria.   Musculoskeletal:  Negative for arthralgias and back pain.   Skin:  Negative for color change and rash.    Neurological:  Negative for seizures and syncope.   Psychiatric/Behavioral:  Positive for suicidal ideas. Negative for decreased concentration, dysphoric mood, hallucinations and homicidal ideas. The patient is not nervous/anxious and is not hyperactive.    All other systems reviewed and are negative.      Physical Exam  Physical Exam  Vitals reviewed.   Constitutional:       General: He is not in acute distress.     Appearance: Normal appearance. He is not ill-appearing.   HENT:      Head: Atraumatic.      Right Ear: External ear normal.      Left Ear: External ear normal.      Mouth/Throat:      Mouth: Mucous membranes are moist.   Eyes:      Pupils: Pupils are equal, round, and reactive to light.   Cardiovascular:      Rate and Rhythm: Normal rate.   Pulmonary:      Effort: Pulmonary effort is normal.   Skin:     General: Skin is warm and dry.   Neurological:      General: No focal deficit present.      Mental Status: He is alert and oriented to person, place, and time.      Cranial Nerves: No cranial nerve deficit.      Motor: No weakness.      Gait: Gait normal.   Psychiatric:         Behavior: Behavior normal.         Thought Content: Thought content normal.         Judgment: Judgment normal.      Comments: Flat affect  NOT SI at this time         Vital Signs  ED Triage Vitals [04/12/24 1637]   Temperature Pulse Respirations Blood Pressure SpO2   (!) 97.4 °F (36.3 °C) 93 18 150/78 96 %      Temp Source Heart Rate Source Patient Position - Orthostatic VS BP Location FiO2 (%)   Temporal Monitor Lying Left arm --      Pain Score       --           Vitals:    04/12/24 1637 04/12/24 1743 04/12/24 2017   BP: 150/78 (!) 183/88 146/72   Pulse: 93 90 98   Patient Position - Orthostatic VS: Lying Lying Sitting         Visual Acuity      ED Medications  Medications - No data to display    Diagnostic Studies  Results Reviewed       Procedure Component Value Units Date/Time    Rapid drug screen, urine [[id number]]   (Normal) Collected: 04/12/24 1746    Lab Status: Final result Specimen: Urine, Clean Catch Updated: 04/12/24 2000     Amph/Meth UR Negative     Barbiturate Ur Negative     Benzodiazepine Urine Negative     Cocaine Urine Negative     Methadone Urine Negative     Opiate Urine Negative     PCP Ur Negative     THC Urine Negative     Oxycodone Urine Negative     Fentanyl Urine Negative     HYDROCODONE URINE Negative    Narrative:      FOR MEDICAL PURPOSES ONLY.   IF CONFIRMATION NEEDED PLEASE CONTACT THE LAB WITHIN 5 DAYS.    Drug Screen Cutoff Levels:  AMPHETAMINE/METHAMPHETAMINES  1000 ng/mL  BARBITURATES     200 ng/mL  BENZODIAZEPINES     200 ng/mL  COCAINE      300 ng/mL  METHADONE      300 ng/mL  OPIATES      300 ng/mL  PHENCYCLIDINE     25 ng/mL  THC       50 ng/mL  OXYCODONE      100 ng/mL  FENTANYL      5 ng/mL  HYDROCODONE     300 ng/mL    TSH [[id number]]  (Normal) Collected: 04/12/24 1740    Lab Status: Final result Specimen: Blood from Hand, Left Updated: 04/12/24 1840     TSH 3RD GENERATON 1.082 uIU/mL     CBC and differential [[id number]]  (Abnormal) Collected: 04/12/24 1740    Lab Status: Final result Specimen: Blood from Hand, Left Updated: 04/12/24 1837     WBC 12.76 Thousand/uL      RBC 4.72 Million/uL      Hemoglobin 14.6 g/dL      Hematocrit 42.3 %      MCV 90 fL      MCH 30.9 pg      MCHC 34.5 g/dL      RDW 13.3 %      MPV 10.1 fL      Platelets 212 Thousands/uL     Narrative:      This is an appended report.  These results have been appended to a previously verified report.    Manual Differential(PHLEBS Do Not Order) [[id number]]  (Abnormal) Collected: 04/12/24 1740    Lab Status: Final result Specimen: Blood from Hand, Left Updated: 04/12/24 1836     Segmented % 32 %      Lymphocytes % 50 %      Monocytes % 4 %      Eosinophils % 1 %      Basophils % 0 %      Atypical Lymphocytes % 13 %      Absolute Neutrophils 4.08 Thousand/uL      Absolute Lymphocytes 8.04 Thousand/uL      Absolute Monocytes 0.51  Thousand/uL      Absolute Eosinophils 0.13 Thousand/uL      Absolute Basophils 0.00 Thousand/uL      Total Counted --     Platelet Estimate Adequate    Comprehensive metabolic panel [[id number]]  (Abnormal) Collected: 04/12/24 1740    Lab Status: Final result Specimen: Blood from Hand, Right Updated: 04/12/24 1826     Sodium 134 mmol/L      Potassium 3.5 mmol/L      Chloride 103 mmol/L      CO2 22 mmol/L      ANION GAP 9 mmol/L      BUN 9 mg/dL      Creatinine 0.95 mg/dL      Glucose 145 mg/dL      Calcium 9.1 mg/dL      AST 11 U/L      ALT 12 U/L      Alkaline Phosphatase 52 U/L      Total Protein 6.4 g/dL      Albumin 4.3 g/dL      Total Bilirubin 1.51 mg/dL      eGFR 86 ml/min/1.73sq m     Narrative:      [employer] guidelines for Chronic Kidney Disease (CKD):     Stage 1 with normal or high GFR (GFR > 90 mL/min/1.73 square meters)    Stage 2 Mild CKD (GFR = 60-89 mL/min/1.73 square meters)    Stage 3A Moderate CKD (GFR = 45-59 mL/min/1.73 square meters)    Stage 3B Moderate CKD (GFR = 30-44 mL/min/1.73 square meters)    Stage 4 Severe CKD (GFR = 15-29 mL/min/1.73 square meters)    Stage 5 End Stage CKD (GFR <15 mL/min/1.73 square meters)  Note: GFR calculation is accurate only with a steady state creatinine    UA w Reflex to Microscopic w Reflex to Culture [[id number]] Collected: 04/12/24 1747    Lab Status: Final result Specimen: Urine, Clean Catch Updated: 04/12/24 1801     Color, UA Colorless     Clarity, UA Clear     Specific Gravity, UA 1.004     pH, UA 6.0     Leukocytes, UA Negative     Nitrite, UA Negative     Protein, UA Negative mg/dl      Glucose, UA Negative mg/dl      Ketones, UA Negative mg/dl      Urobilinogen, UA <2.0 mg/dl      Bilirubin, UA Negative     Occult Blood, UA Negative    POCT alcohol breath test [[id number]]  (Normal) Resulted: 04/12/24 1755    Lab Status: Final result Updated: 04/12/24 1755     EXTBreath Alcohol 0.0                   No orders to display               Procedures  Procedures         ED Course  ED Course as of 04/12/24 2100 Fri Apr 12, 2024 1837 WBC(!): 12.76   1837 Hemoglobin: 14.6   1837 Hematocrit: 42.3   1837 Leukocytes, UA: Negative   1837 Nitrite, UA: Negative   1837 Sodium(!): 134   1837 Potassium: 3.5   1837 BUN: 9   1837 Creatinine: 0.95   1837 This patient presents with symptoms consistent with an underlying psychiatric disorder, most likely depression.   Presentation not consistent with acute organic causes to include delirium, dementia or drug induced disorders (acute ingestions or withdrawal; no evidence of toxidrome). Given the H&P, I suspect this patient is major depression and will require psychiatric care.   Will consult crisis worker to evaluate the patient for potential hold.   Will also obtain labs for medical clearance.    Patient has been medically evaluated by myself and is determined to be stable and cleared for further mental health evaluation and treatment.                                               Medical Decision Making  I have ordered CBC.  This was done to assess for leukocytosis versus leukopenia as possible indicators of infection viral versus bacterial.  Also included hemoglobin and hematocrit for assess for anemia.  And assess platelet numbers.  Thrombocytosis as a marker of inflammation and of course thrombocytopenia assess result of coagulation disorder or DIC.    Metabolic panel to assess for electrolyte deficiency, assess kidney function, and blood sugar levels to assess for hypoglycemia versus hyperglycemia as possible causes of the patient's symptoms.    Liver function test -to assess for liver inflammation with a be viral, obstructive, or reactive to underlying infection/shock liver        Problems Addressed:  Depression: chronic illness or injury     Details: Seen by tele-psych  No medication changes      Amount and/or Complexity of Data Reviewed  Labs: ordered. Decision-making details documented in ED  Course.             Disposition  Final diagnoses:   Depression     Time reflects when diagnosis was documented in both MDM as applicable and the Disposition within this note       Time User Action Codes Description Comment    4/12/2024  6:53 PM Jose Luis Sun Add [F32.A] Depression           ED Disposition       ED Disposition   Discharge    Condition   Stable    Date/Time   Fri Apr 12, 2024  8:59 PM    Comment   Jaswinder Lantigua discharge to home/self care.                   MD Documentation      Flowsheet Row Most Recent Value   Sending MD Dr YANG Sun          Follow-up Information       Follow up With Specialties Details Why Contact Info    Brigido Kauffman MD  In 3 days If symptoms worsen [address] [facility] [address]  [phone]              Patient's Medications   Discharge Prescriptions    No medications on file       No discharge procedures on file.    PDMP Review       None            ED Provider  Electronically Signed by             Jose Luis Sun MD  04/12/24 4247

## 2024-04-13 NOTE — ED NOTES
D/C summary expressed and provided. Pt is currently waiting in Pt's waiting room for his transportation.     Amelie Baez RN  04/12/24 5752

## 2024-04-13 NOTE — ED NOTES
Crisis Clinician in at bedside, Tele Psych- Dr. Boggs via TeleHealth iPad in progress/interview. Pt observed AAOx4, cooperative at present.     Amelie Baez, SAUL  04/12/24 2020

## 2024-04-13 NOTE — DISCHARGE INSTRUCTIONS
A  personal message from Dr. Jose Luis Sun,  Thank you so much for allowing me to care for you today.    I pride myself in the care and attention I give all my patients.  I hope you were a witness to this tonight.   If for any reason your condition does not improve or worsens, or you have a question that was not answered during your visit you can feel free to text me on my personal phone #  # [phone].   I will answer to your message and continue your care past your emergency room visit.     Please understand that although you are being discharged because your condition has been deemed stable and able to be managed on an outpatient setting. However your condition may worsen as part of the natural progression of the illness/condition, if this occurs please come back to the emergency department for a repeat evaluation.

## 2024-04-13 NOTE — ED NOTES
Provider at bedside to speak with Pt re: Pt requested to speak with ER Provider during attempt of D/C summary.     Amelie Baez RN  04/12/24 7084

## 2024-04-13 NOTE — COVID-19 HEALTH CARE FACILITY TRANSFER FORM
TeleConsultation - Behavioral Health   Jaswinder Lantigua 58 y.o. male MRN: [id number]  Unit/Bed#: ED 07 Encounter: [id number]        REQUIRED DOCUMENTATION:     1. This service was provided via Telemedicine.  2. Provider located at [place].  3. TeleMed provider: Angelica Boggs MD.  4. Identify all parties in room with patient during tele consult: Patient   5.Patient was then informed that this was a Telemedicine visit and that the exam was being conducted confidentially over secure lines. My office door was closed. No one else was in the room.  Patient acknowledged consent and understanding of privacy and security of the Telemedicine visit, and gave us permission to have the assistant stay in the room in order to assist with the history and to conduct the exam.  I informed the patient that I have reviewed their record in Epic and presented the opportunity for them to ask any questions regarding the visit today.  The patient agreed to participate.      Discussed with Jose Luis Sun M.D     Assessment/Plan     Present on Admission:  **None**    Assessment:    Unspecified Mood Disorder    Patient not interested in medication changes and not presenting with any acute or imminent safety concerns and safe for discharge home with established OP follow up.     Treatment Plan:    Planned Medication Changes:    -None     Current Medications:         Risks / Benefits of Treatment:    Risks, benefits, and possible side effects of medications explained to patient and patient verbalizes understanding.      Other treatment modalities recommended as indicated:    psychotherapy  outpatient referral      Consults  Physician Requesting Consult: Jose Luis Sun MD  Principal Problem:<principal problem not specified>    Reason for Consult:  Psych Evaluation       History of Present Illness      Per Crisis Evaluation by Roya Luque:  Pt presents to the ED on the recommendation of his PCP due to ongoing depression. Pt denies any suicidal or  "homicidal ideations, nor any auditory or visual hallucinations at this time. Pt does note that he was passively suicidal 1 week ago, but did not have any plans in mind. Pt reports that his depression is a function of his cardiomyopathy and leukemia, which he does not believe are getting any better. Pt denies ever having made any suicide attempts, and denies any self injurious behaviors. Pt denies any D & A problems, nor any legal issues. Pt carlitos any Hx of abuse or neglect. Pt reports poor sleep due to difficulties in both falling and staying asleep. He also notes a poor appetite, eating 1-2 meals daily. Pt reports anhedonia, and not taking care of his ADLs on a regular basis. Pt notes that he sits in his chair and watches TV all day. Pt is disabled and does not work. Pt notes that he resides with his elderly mother who takes care of him. Pt notes he receives out-pt Tx services via [facility], with therapy once monthly and psychiatry every 3 mos. Pt does not feel as though his meds are helping him at all. When asked if he is willing to call [facility] to see about a med adjustment/change, pt stated \"my mother takes care of that\". Pt notes he has been hospitalized at [facility] a few times, most recently as 1 yr ago. CW discussed Tx options with pt, including 201 voluntary in-pt, but pt is refusing stating that it lauren not help him and will continue to be ill medically regardless. CW discussed this case with Dr Sun who notes that although there are no grounds for a 302, a psych consult would be in order to assess for a possible med change     Patient reports that he was brought in by his mother and that he has been dealing with depression due to his poor physical health and that he doesn't want any treatment because nothing will make him heathy again. Patient denied any desire for medication changes. Patient denied any current SI/HI/AVH or other acute psychiatric complaints.       Psychiatric Review Of Systems:    sleep: " yes  appetite changes: yes  weight changes: yes  energy/anergy: yes  interest/pleasure/anhedonia: yes  somatic symptoms: no  anxiety/panic: yes  alyssa: no  guilty/hopeless: no  self injurious behavior/risky behavior: no    Historical Information     Past Psychiatric History:     Psychiatric Hospitalizations:   Several past inpatient psychiatric admissions  Outpatient Treatment History:   [facility]   Suicide Attempts:   None  History of self-harm:   None  Violence History:   no  Past Psychiatric medication trials: Multiple     Substance Abuse History: Denied         Family Psychiatric History:  Denied        Social History:     Education: high school diploma/GED  Learning Disabilities:  Denied   Marital history: single  Children: Denied   Living arrangement, social support: The patient lives in home with mother.  Occupational History: unemployed  Functioning Relationships: good support system.  Other Pertinent History: None    Traumatic History:     Abuse: None  Other Traumatic Events: none    Past Medical History:   Diagnosis Date    Colon polyp     Coronary artery disease     Hypertension     Leukemia (HCC)        Medical Review Of Systems:    Review of Systems    Meds/Allergies     all current active meds have been reviewed  No Known Allergies    Objective     Vital signs in last 24 hours:  Temp:  [97.4 °F (36.3 °C)] 97.4 °F (36.3 °C)  HR:  [90-93] 90  Resp:  [18] 18  BP: (150-183)/(78-88) 183/88    No intake or output data in the 24 hours ending 04/12/24 2012    Mental Status Evaluation:    Appearance:  age appropriate   Behavior:  normal   Speech:  normal pitch and normal volume   Mood:  normal   Affect:  normal   Language: naming objects   Thought Process:  normal   Associations intact associations   Thought Content:  normal   Perceptual Disturbances: None   Risk Potential: Suicidal Ideations none  Homicidal Ideations none  Potential for Aggression No   Sensorium:  person, place, and time/date   Cognition:   recent and remote memory grossly intact   Consciousness:  alert    Attention: attention span and concentration were age appropriate   Intellect: within normal limits   Fund of Knowledge: awareness of current events: President    Insight:  limited   Judgment: limited   Muscle Strength:  Muscle Tone: normal  NFT  normal   Gait/Station: normal gait/station   Motor Activity: no abnormal movements       Lab Results: I have personally reviewed all pertinent laboratory/tests results.     Most Recent Labs:   Lab Results   Component Value Date    WBC 12.76 (H) 04/12/2024    RBC 4.72 04/12/2024    HGB 14.6 04/12/2024    HCT 42.3 04/12/2024     04/12/2024    RDW 13.3 04/12/2024    SODIUM 134 (L) 04/12/2024    K 3.5 04/12/2024     04/12/2024    CO2 22 04/12/2024    BUN 9 04/12/2024    CREATININE 0.95 04/12/2024    GLUC 145 (H) 04/12/2024    CALCIUM 9.1 04/12/2024    AST 11 (L) 04/12/2024    ALT 12 04/12/2024    ALKPHOS 52 04/12/2024    TP 6.4 04/12/2024    ALB 4.3 04/12/2024    TBILI 1.51 (H) 04/12/2024    IKM2FCPBLIRX 1.082 04/12/2024    HGBA1C 5.1 05/03/2020     05/03/2020       Imaging Studies: CT renal stone study abdomen pelvis wo contrast    Result Date: 3/29/2024  Narrative: History: Abdominal pain, acute, nonlocalized, diarrhea Technique: CT scan of the abdomen and pelvis without IV contrast. Comparison: July 20 2023 Findings: No airspace consolidation or pleural effusion in the lung bases. Abdomen:Lack of IV contrast decreases the sensitivity of the study, particularly in the parenchymal organs. Liver: No discrete focal mass Gallbladder: Small stones are present within the gallbladder which is otherwise unremarkable Spleen: No splenomegaly Pancreas: No acute pancreatitis Adrenals:Normal Kidney: Cortical cysts on the left otherwise kidneys normal without opaque stone or hydronephrosis Intestine and mesentery: No distended bowel loop or ascites. Small hiatal hernia is present. Lymph nodes: No upper  abdominal or retroperitoneal lymphadenopathy Vessels: No abdominal aortic aneurysm Anterior abdominal wall: Small fat-containing umbilical hernia is present Pelvis: Prostate is slightly prominent. Seminal vesicles symmetric. Sigmoid diverticulosis present without acute diverticulitis. Fat-containing left inguinal hernias present. No abnormal fluid collection the pelvis appendix is not seen per se but no abnormal bowel loop suggest acute appendicitis. Urinary bladder:[Unremarkable] Lymph nodes: No pelvic lymphadenopathy Bones: No destructive bony lesion    Impression: IMPRESSION: 1.  No evidence of acute abnormality in the abdomen or pelvis on this unenhanced study, particularly no evidence of opaque stone or hydronephrosis. 2.  Cholelithiasis without acute cholecystitis. 3.  Left renal cortical cysts. 4.  Sigmoid diverticulosis without acute diverticulitis. 5.  Small fat-containing umbilical and left inguinal hernias. Workstation:[id number]    EKG/Pathology/Other Studies:   Lab Results   Component Value Date    VENTRATE 98 11/09/2021    ATRIALRATE 98 11/09/2021    PRINT 176 11/09/2021    QRSDINT 122 11/09/2021    QTINT 380 11/09/2021    QTCINT 485 11/09/2021    PAXIS 48 11/09/2021    QRSAXIS 4 11/09/2021    TWAVEAXIS 50 11/09/2021        Code Status: No Order  Advance Directive and Living Will:      Power of :    POLST:      Screenings:    1. Nutrition Screening  Nutrition Assessment (completed by Staff): Nutrition  Appetite: Fair    2. Pain Screening  Pain Screening:      3. Suicide Screening  ED Crisis Suicide Risk Assessment: Suicide Risk Assessment  Violence Risk to Self: Yes- Within the past 6 months  Description of self harming behaviors or thoughts:: Hx of passive SI  Protective Factors: The patient has a reliable support system, The patient has no thoughts of suicide      Counseling / Coordination of Care:    Total floor / unit time spent today 30 minutes. Greater than 50% of total time was spent with  the patient and / or family counseling and / or coordination of care. A description of the counseling / coordination of care: Direct Patient Care, Chart Review, and Documentation.           negative...

## 2024-04-13 NOTE — ED NOTES
"Pt is up for D/C to home, belongings provided, and attempt to review D/C summary, Pt wanted to speak with Provider \"wondering what is going on.\"  Re-attempt to express D/C instructions, pt request to speak with Dr. ASH sent to ER Provider. Pt remains in room, AAOx3, and calm  at present.     Amelie Baez RN  04/12/24 [id number]    "

## 2024-04-13 NOTE — ED NOTES
"As per Dr Boggs's recommendation, pt may be D/C'ed home.    \"Patient not interested in medication changes and not presenting with any acute or imminent safety concerns and safe for discharge home with established OP follow up.\"    ROMY London, CIS ll  04/12/24 21:16  "

## 2024-04-13 NOTE — ED NOTES
Offered transportation services home. Pt refused and insist calling his mother for transportation. Made Pt aware of weather conditions and time of night and provided education with safety. Pt insist on having his mother called for transportation. Call made out to Pt's elderly parent- Milly Lantigua, Per Pt's parent, I will be there in twenty minutes, have him waiting in the waiting room looking out for me. Pt was made updated for same.      Amelie Baez RN  04/12/24 0582       Amelie Baez RN  04/12/24 3900

## 2024-04-13 NOTE — ED NOTES
Dr. Boggs is currently speaking with Pt via [employer] iPad.     Amelie Baez RN  04/12/24 2268